# Patient Record
Sex: MALE | URBAN - NONMETROPOLITAN AREA
[De-identification: names, ages, dates, MRNs, and addresses within clinical notes are randomized per-mention and may not be internally consistent; named-entity substitution may affect disease eponyms.]

---

## 2023-02-21 ENCOUNTER — APPOINTMENT (OUTPATIENT)
Dept: URBAN - NONMETROPOLITAN AREA SURGERY 7 | Age: 83
Setting detail: DERMATOLOGY
End: 2023-02-22

## 2023-02-21 VITALS — SYSTOLIC BLOOD PRESSURE: 132 MMHG | HEART RATE: 74 BPM | TEMPERATURE: 97.3 F | DIASTOLIC BLOOD PRESSURE: 84 MMHG

## 2023-02-21 PROBLEM — C44.329 SQUAMOUS CELL CARCINOMA OF SKIN OF OTHER PARTS OF FACE: Status: ACTIVE | Noted: 2023-02-21

## 2023-02-21 PROCEDURE — OTHER MOHS SURGERY: OTHER

## 2023-02-21 PROCEDURE — OTHER MIPS QUALITY: OTHER

## 2023-02-21 PROCEDURE — 17311 MOHS 1 STAGE H/N/HF/G: CPT

## 2023-02-21 PROCEDURE — OTHER SURGICAL DECISION MAKING: OTHER

## 2023-02-21 PROCEDURE — 12052 INTMD RPR FACE/MM 2.6-5.0 CM: CPT

## 2023-02-21 PROCEDURE — OTHER COUNSELING: OTHER

## 2023-02-21 NOTE — PROCEDURE: MOHS SURGERY
Body Location Override (Optional - Billing Will Still Be Based On Selected Body Map Location If Applicable): right side of forehead

## 2023-02-21 NOTE — HPI: PROCEDURE (SKIN SURGERY)
Has The Growth Been Previously Biopsied?: has been previously biopsied
Body Location Override (Optional): Right side of forehead

## 2023-02-21 NOTE — PROCEDURE: MOHS SURGERY
Partial Purse String (Intermediate) Text: Given the location of the defect and the characteristics of the surrounding skin an intermediate purse string closure was deemed most appropriate.  Undermining was performed circumferentially around the surgical defect.  A purse string suture was then placed and tightened. Wound tension of the circular defect prevented complete closure of the wound. 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

## 2023-02-21 NOTE — PROCEDURE: MOHS SURGERY
No Bcc  Nodular Histology Text: There were numerous large/nodular aggregates of palisaded basaloid cells with peripheral clefting

## 2023-02-21 NOTE — PROCEDURE: SURGICAL DECISION MAKING
Risk Assessment Explanation (Does Not Render In The Note): Clinical determination of the probability and/or consequences of an event, such as surgery. Clinical assessment of the level of risk is affected by the nature of the event under consideration for the patient. Modifier 57 is used to indicate an Evaluation and Management (E/M) service resulted in the initial decision to perform surgery either the day before a major surgery (90 day global) or the day of a major surgery.
Complexity (Necessary For Coding; Major - 90 Day Global With Some Exceptions; Minor - 10 Day Global): major
Discussion: After the Mohs procedure was complete, a separate and distinct evaluation and management was performed for the resultant surgical defect for potential reconstruction using flap or graft.  Complications and risk of morbidity of each were discussed including (but not limited to) scarring, which could distort a free anatomic margin of the eyelid, nose, ear or lip, thereby negatively affecting body function of these structures.
Identified Risk Factors Documented?: yes
Initial Decision For Surgery?: No
Date Of Surgery - Today Or Tomorrow?: today

## 2023-02-21 NOTE — PROCEDURE: MOHS SURGERY
Consent (Ear)/Introductory Paragraph: The rationale for Mohs was explained to the patient and consent was obtained. The risks, benefits and alternatives to therapy were discussed in detail. Specifically, the risks of ear deformity, infection, scarring, bleeding, prolonged wound healing, incomplete removal, allergy to anesthesia, nerve injury and recurrence were addressed. Prior to the procedure, the treatment site was clearly identified and confirmed by the patient. All components of Universal Protocol/PAUSE Rule completed. Nasalis-Muscle-Based Myocutaneous Island Pedicle Flap Text: Using a #15 blade, an incision was made around the donor flap to the level of the nasalis muscle. Wide lateral undermining was then performed in both the subcutaneous plane above the nasalis muscle, and in a submuscular plane just above periosteum. This allowed the formation of a free nasalis muscle axial pedicle (based on the angular artery) which was still attached to the actual cutaneous flap, increasing its mobility and vascular viability. Hemostasis was obtained with pinpoint electrocoagulation. The flap was mobilized into position and the pivotal anchor points positioned and stabilized with buried interrupted sutures. Subcutaneous and dermal tissues were closed in a multilayered fashion with sutures. Tissue redundancies were excised, and the epidermal edges were apposed without significant tension and sutured with sutures.

## 2023-03-02 ENCOUNTER — APPOINTMENT (OUTPATIENT)
Dept: URBAN - NONMETROPOLITAN AREA SURGERY 7 | Age: 83
Setting detail: DERMATOLOGY
End: 2023-03-07

## 2023-03-02 DIAGNOSIS — Z48.02 ENCOUNTER FOR REMOVAL OF SUTURES: ICD-10-CM

## 2023-03-02 PROCEDURE — OTHER SUTURE REMOVAL (GLOBAL PERIOD): OTHER

## 2023-03-02 PROCEDURE — OTHER MIPS QUALITY: OTHER

## 2023-03-02 ASSESSMENT — LOCATION SIMPLE DESCRIPTION DERM: LOCATION SIMPLE: RIGHT FOREHEAD

## 2023-03-02 ASSESSMENT — LOCATION DETAILED DESCRIPTION DERM: LOCATION DETAILED: RIGHT FOREHEAD

## 2023-03-02 ASSESSMENT — LOCATION ZONE DERM: LOCATION ZONE: FACE

## 2023-03-02 NOTE — PROCEDURE: SUTURE REMOVAL (GLOBAL PERIOD)
Detail Level: Detailed
Add 76709 Cpt? (Important Note: In 2017 The Use Of 28905 Is Being Tracked By Cms To Determine Future Global Period Reimbursement For Global Periods): no

## 2023-03-02 NOTE — PROCEDURE: MIPS QUALITY
Quality 110: Preventive Care And Screening: Influenza Immunization: Influenza Immunization not Administered for Documented Reasons.
Quality 47: Advance Care Plan: Advance Care Planning discussed and documented in the medical record; patient did not wish or was not able to name a surrogate decision maker or provide an advance care plan.
Quality 226: Preventive Care And Screening: Tobacco Use: Screening And Cessation Intervention: Patient screened for tobacco use and is an ex/non-smoker
Quality 130: Documentation Of Current Medications In The Medical Record: Current Medications Documented
Detail Level: Detailed

## 2023-06-13 NOTE — PROCEDURE: MOHS SURGERY
Bcc Superficial Histology Text: There were aggregates of basaloid cells budding from the epidermis. Advancement-Rotation Flap Text: The defect edges were debeveled with a #15 scalpel blade.  Given the location of the defect, shape of the defect and the proximity to free margins an advancement-rotation flap was deemed most appropriate.  Using a sterile surgical marker, an appropriate flap was drawn incorporating the defect and placing the expected incisions within the relaxed skin tension lines where possible. The area thus outlined was incised deep to adipose tissue with a #15 scalpel blade.  The skin margins were undermined to an appropriate distance in all directions utilizing iris scissors.